# Patient Record
Sex: FEMALE | Race: WHITE | NOT HISPANIC OR LATINO | Employment: OTHER | URBAN - METROPOLITAN AREA
[De-identification: names, ages, dates, MRNs, and addresses within clinical notes are randomized per-mention and may not be internally consistent; named-entity substitution may affect disease eponyms.]

---

## 2022-01-25 ENCOUNTER — ESTABLISHED PATIENT (OUTPATIENT)
Dept: URBAN - METROPOLITAN AREA CLINIC 21 | Facility: CLINIC | Age: 82
End: 2022-01-25

## 2022-01-25 DIAGNOSIS — H02.886: ICD-10-CM

## 2022-01-25 DIAGNOSIS — H57.89: ICD-10-CM

## 2022-01-25 DIAGNOSIS — H01.021: ICD-10-CM

## 2022-01-25 DIAGNOSIS — H01.024: ICD-10-CM

## 2022-01-25 DIAGNOSIS — H02.883: ICD-10-CM

## 2022-01-25 DIAGNOSIS — H25.13: ICD-10-CM

## 2022-01-25 PROCEDURE — 99202 OFFICE O/P NEW SF 15 MIN: CPT

## 2022-01-25 ASSESSMENT — TONOMETRY
OD_IOP_MMHG: 17
OS_IOP_MMHG: 20

## 2022-01-25 ASSESSMENT — VISUAL ACUITY
OS_SC: 20/40
OD_SC: 20/80

## 2022-01-25 NOTE — PATIENT DISCUSSION
patient to continue to use Tobradex to the right eye if it continues to drain over the next few days. If that does not seem to help, will send in Sohail-poly-dex inplace three times a day for one week.

## 2022-02-18 ENCOUNTER — FOLLOW UP (OUTPATIENT)
Dept: URBAN - METROPOLITAN AREA CLINIC 20 | Facility: CLINIC | Age: 82
End: 2022-02-18

## 2022-02-18 DIAGNOSIS — H16.223: ICD-10-CM

## 2022-02-18 DIAGNOSIS — H21.562: ICD-10-CM

## 2022-02-18 DIAGNOSIS — H02.412: ICD-10-CM

## 2022-02-18 PROCEDURE — 68840 EXPLORE/IRRIGATE TEAR DUCTS: CPT

## 2022-02-18 PROCEDURE — 99213 OFFICE O/P EST LOW 20 MIN: CPT

## 2022-02-18 RX ORDER — LOTEPREDNOL ETABONATE 2 MG/ML
1 SUSPENSION/ DROPS OPHTHALMIC TWICE A DAY
End: 2022-03-11

## 2022-02-18 ASSESSMENT — TONOMETRY
OS_IOP_MMHG: 18
OD_IOP_MMHG: 17

## 2022-02-18 ASSESSMENT — VISUAL ACUITY
OS_SC: 20/40
OD_SC: 20/80

## 2022-02-18 NOTE — PROCEDURE NOTE: CLINICAL
PROCEDURE NOTE: Probing of Lacrimal Canaliculi, With or Without Irrigation Bilateral Lower Lids. Diagnosis: Mild Keratoconjunctivitis Sicca. Prior to treatment, the risks/benefits/alternatives were discussed. The patient wished to proceed with procedure. Patient tolerated procedure well. There were no complications. Post-op instructions given. Venita Horton

## 2022-02-18 NOTE — PATIENT DISCUSSION
Ptosis is when the upper eyelid droops over the eye. The eyelid may droop just a little, or so much that it covers the pupil (the black dot at the center of your eye that lets light in). Ptosis can limit or even completely block normal vision. Fortunately, this condition can be treated to improve vision as well as appearance. Ptosis occurs when the levator muscle stretches or separates away from their eyelid. This can be caused by aging or an eye injury. Rarely, diseases or tumors can affect the eyelid muscle, causing ptosis.

## 2022-02-18 NOTE — PATIENT DISCUSSION
PROBING AND IRRIGATION DONE TODAY with positive flow - PATIENT SAID SHE FEELS A LITTLE BETTER ALREADY. [FreeTextEntry1] : diarrhea

## 2022-02-18 NOTE — PATIENT DISCUSSION
OS SLIGHTLY SMALLER PUPIL -- WITH PTOSIS ALSO NOTED OS-- BROOKE'S SYNDROME? -- WILL HAVE DOCTORS AT HOME INVESTIGATE IF THEY HAVE CONCERN.

## 2022-11-07 ENCOUNTER — ESTABLISHED PATIENT (OUTPATIENT)
Dept: URBAN - METROPOLITAN AREA CLINIC 21 | Facility: CLINIC | Age: 82
End: 2022-11-07

## 2022-11-07 DIAGNOSIS — H02.412: ICD-10-CM

## 2022-11-07 DIAGNOSIS — H01.021: ICD-10-CM

## 2022-11-07 DIAGNOSIS — H01.024: ICD-10-CM

## 2022-11-07 DIAGNOSIS — H02.88A: ICD-10-CM

## 2022-11-07 DIAGNOSIS — H16.223: ICD-10-CM

## 2022-11-07 DIAGNOSIS — H25.813: ICD-10-CM

## 2022-11-07 DIAGNOSIS — H02.88B: ICD-10-CM

## 2022-11-07 PROCEDURE — 92136 OPHTHALMIC BIOMETRY: CPT

## 2022-11-07 PROCEDURE — 68801 DILATE TEAR DUCT OPENING: CPT

## 2022-11-07 PROCEDURE — 92014 COMPRE OPH EXAM EST PT 1/>: CPT

## 2022-11-07 PROCEDURE — 0330T TEAR FILM IMG UNI/BI W/I&R: CPT

## 2022-11-07 RX ORDER — CYCLOSPORINE 0 MG/ML
1 SOLUTION/ DROPS OPHTHALMIC; TOPICAL ONCE A DAY
Start: 2022-11-07

## 2022-11-07 RX ORDER — MINERAL OIL 2; 2 MG/.4ML; MG/.4ML
1 EMULSION OPHTHALMIC TWICE A DAY
Start: 2022-11-07

## 2022-11-07 ASSESSMENT — VISUAL ACUITY
OU_CC: J1-2
OU_SC: J2
OD_CC: 20/70-1
OU_CC: 20/25-1
OD_PH: 20/60
OS_CC: 20/30-2

## 2022-11-07 ASSESSMENT — KERATOMETRY
OD_K1POWER_DIOPTERS: 45.00
OD_AXISANGLE_DEGREES: 103
OD_K2POWER_DIOPTERS: 45.75
OS_AXISANGLE2_DEGREES: 90
OS_AXISANGLE_DEGREES: 0
OS_K1POWER_DIOPTERS: 45.50
OS_K2POWER_DIOPTERS: 45.50
OD_AXISANGLE2_DEGREES: 13

## 2022-11-07 ASSESSMENT — TONOMETRY
OD_IOP_MMHG: 18
OS_IOP_MMHG: 18

## 2022-11-07 NOTE — PATIENT DISCUSSION
"* Patient has some frustration in previous doctors NOT giving her a glasses Rx.  She would like to wait for getting cataract surgery due to some scary concerns.  She has friends that have not had a good outcome and it makes her very nervous.  We discussed those concerns in the office today.  -- THE PATIENT WILL NEED SURGERY SOON -- GLASSES ARE NOT GOING TO HELP HER MUCH AT THIS POINT.  HOWEVER, THE PATIENT IS STILL APPREHENSIVE AND WANTS TO ""THINK"" ABOUT IT. --  In the meantime. ..if the patient wishes to come in for an MRX to try to get her vision as good as possible w/o surgery. ...she can at any time. "

## 2022-11-07 NOTE — PATIENT DISCUSSION
** Pt saw doctor at home ([address]-- ruled out Carrol's syndrome 2022 -- .OS SLIGHTLY SMALLER PUPIL -- WITH PTOSIS ALSO NOTED OS--.

## 2022-11-07 NOTE — PATIENT DISCUSSION
* PROBING AND IRRIGATION DONE TODAY -- 2nd time. ...with positive flow - PATIENT SAID SHE FEELS A LITTLE BETTER ALREADY.

## 2022-11-07 NOTE — PROCEDURE NOTE: CLINICAL
PROCEDURE NOTE: Dilation of Lacrimal Punctum, With or Without Irrigation Prior to treatment, the risks/benefits/alternatives were discussed. The patient wished to proceed with procedure. Patient tolerated procedure well. There were no complications. Post op instructions given. Rupal Escoto

## 2022-11-07 NOTE — PATIENT DISCUSSION
* Sent message to Geoff Messina to get surgery dates scheduled for when patient comes back in town Jan 2023. -- Tentatively down for Jan. 25th and Feb 1, pt is aware.

## 2022-11-07 NOTE — PATIENT DISCUSSION
* Dr. Radha Staley Recommends:  - Toric (to get best vision clarity) // - Vivity lens (due to pt's concern over glare at night-- her  has had horrible trouble with that since he had his sx).

## 2022-11-09 ENCOUNTER — ESTABLISHED PATIENT (OUTPATIENT)
Dept: URBAN - METROPOLITAN AREA CLINIC 21 | Facility: CLINIC | Age: 82
End: 2022-11-09

## 2022-11-09 DIAGNOSIS — H02.88A: ICD-10-CM

## 2022-11-09 DIAGNOSIS — H02.412: ICD-10-CM

## 2022-11-09 DIAGNOSIS — H02.88B: ICD-10-CM

## 2022-11-09 DIAGNOSIS — H25.813: ICD-10-CM

## 2022-11-09 DIAGNOSIS — H21.562: ICD-10-CM

## 2022-11-09 DIAGNOSIS — H01.024: ICD-10-CM

## 2022-11-09 DIAGNOSIS — H01.021: ICD-10-CM

## 2022-11-09 DIAGNOSIS — H16.223: ICD-10-CM

## 2022-11-09 DIAGNOSIS — H52.4: ICD-10-CM

## 2022-11-09 PROCEDURE — 99212 OFFICE O/P EST SF 10 MIN: CPT

## 2022-11-09 ASSESSMENT — KERATOMETRY
OD_AXISANGLE2_DEGREES: 13
OD_K2POWER_DIOPTERS: 45.75
OS_AXISANGLE2_DEGREES: 90
OS_K1POWER_DIOPTERS: 45.50
OD_AXISANGLE_DEGREES: 103
OD_K1POWER_DIOPTERS: 45.00
OS_AXISANGLE_DEGREES: 0
OS_K2POWER_DIOPTERS: 45.50

## 2022-11-09 ASSESSMENT — TONOMETRY
OD_IOP_MMHG: 17
OS_IOP_MMHG: 13

## 2022-11-09 ASSESSMENT — VISUAL ACUITY
OU_CC: 20/30+2
OD_CC: 20/70
OS_CC: 20/40+1

## 2022-11-09 NOTE — PATIENT DISCUSSION
* Dr. Alana Christine Recommends:  - Toric (to get best vision clarity) // - Vivity lens (due to pt's concern over glare at night-- her  has had horrible trouble with that since he had his sx).

## 2022-11-09 NOTE — PATIENT DISCUSSION
PROBING AND IRRIGATION DONE TODAY with positive flow - PATIENT SAID SHE FEELS A LITTLE BETTER ALREADY.

## 2022-11-10 ENCOUNTER — ESTABLISHED PATIENT (OUTPATIENT)
Dept: URBAN - METROPOLITAN AREA CLINIC 21 | Facility: CLINIC | Age: 82
End: 2022-11-10

## 2022-11-10 DIAGNOSIS — H25.813: ICD-10-CM

## 2022-11-10 DIAGNOSIS — H16.223: ICD-10-CM

## 2022-11-10 DIAGNOSIS — H02.88B: ICD-10-CM

## 2022-11-10 DIAGNOSIS — H02.88A: ICD-10-CM

## 2022-11-10 PROCEDURE — 66999TH

## 2022-11-10 PROCEDURE — 66999PR

## 2022-11-10 PROCEDURE — 99211NC NO CHARGE VISIT

## 2022-11-10 RX ORDER — CYCLOSPORINE 0.5 MG/ML: 1 EMULSION OPHTHALMIC ONCE A DAY

## 2022-11-10 ASSESSMENT — VISUAL ACUITY
OU_CC: 20/40+2
OD_CC: 20/70-2
OS_CC: 20/40+1

## 2022-11-10 ASSESSMENT — TONOMETRY
OS_IOP_MMHG: 17
OD_IOP_MMHG: 18

## 2022-11-10 NOTE — PROCEDURE NOTE: CLINICAL
PROCEDURE NOTE: Thermi Lid and Probing All 4 Lids. Diagnosis: Meibomian Gland Dysfunction. The risks, benefits and alternatives of the procedure were discussed with the patient. The patient read and signed the consent form, was identified, and seated in the exam chair. After anesthesia prep was performed as above. There was a surgical time out to verify the patient, eye and medication. Slit lamp exam was preformed to view eyelid margins and Meibomian glands. Mar Ramal images were reviewed to establish location of specific target areas. Meibomian gland probes were used to unblock the obstructed Meibomian orifices. The eyelid tissue was warmed within a therapeutic target range of 36-38 degrees Celsius to melt the meibum blocking the orifices. Compression of the eyelid was used to express the melted meibum through the orifices. The eye was irrigated with sterile eye rinse solution. Patient tolerated procedure well. There were no complications. Post procedure instructions given. Itzel Rollins

## 2022-11-10 NOTE — PATIENT DISCUSSION
- Dr. Dyllan Cruz Recommends:  - Toric (to get best vision clarity) // - Vivity lens (due to pt's concern over glare at night-- her  has had horrible trouble with that since he had his sx).

## 2022-11-10 NOTE — PATIENT DISCUSSION
"- Patient has some frustration in previous doctors NOT giving her a glasses Rx.  She would like to wait for getting cataract surgery due to some scary concerns.  She has friends that have not had a good outcome and it makes her very nervous.  We discussed those concerns in the office today.  -- THE PATIENT WILL NEED SURGERY SOON -- GLASSES ARE NOT GOING TO HELP HER MUCH AT THIS POINT.  HOWEVER, THE PATIENT IS STILL APPREHENSIVE AND WANTS TO ""THINK"" ABOUT IT. --  In the meantime. ..if the patient wishes to come in for an MRX to try to get her vision as good as possible w/o surgery. ...she can at any time. "

## 2023-01-10 ENCOUNTER — ESTABLISHED PATIENT (OUTPATIENT)
Dept: URBAN - METROPOLITAN AREA CLINIC 20 | Facility: CLINIC | Age: 83
End: 2023-01-10

## 2023-01-10 VITALS
SYSTOLIC BLOOD PRESSURE: 158 MMHG | HEIGHT: 65 IN | DIASTOLIC BLOOD PRESSURE: 89 MMHG | HEART RATE: 67 BPM | WEIGHT: 170 LBS | BODY MASS INDEX: 28.32 KG/M2

## 2023-01-10 DIAGNOSIS — H02.88A: ICD-10-CM

## 2023-01-10 DIAGNOSIS — H16.223: ICD-10-CM

## 2023-01-10 DIAGNOSIS — H18.513: ICD-10-CM

## 2023-01-10 DIAGNOSIS — H25.813: ICD-10-CM

## 2023-01-10 DIAGNOSIS — H02.88B: ICD-10-CM

## 2023-01-10 PROCEDURE — 92136 OPHTHALMIC BIOMETRY: CPT

## 2023-01-10 PROCEDURE — 92014 COMPRE OPH EXAM EST PT 1/>: CPT

## 2023-01-10 ASSESSMENT — KERATOMETRY
OD_K1POWER_DIOPTERS: 44.25
OD_K2POWER_DIOPTERS: 45.25
OS_AXISANGLE2_DEGREES: 167
OD_K1POWER_DIOPTERS: 44.50
OD_AXISANGLE2_DEGREES: 13
OD_AXISANGLE_DEGREES: 105
OD_AXISANGLE_DEGREES: 103
OS_AXISANGLE_DEGREES: 78
OS_K2POWER_DIOPTERS: 45.75
OS_K1POWER_DIOPTERS: 44.00
OD_AXISANGLE_DEGREES: 108
OS_AXISANGLE2_DEGREES: 168
OS_AXISANGLE_DEGREES: 77
OS_AXISANGLE_DEGREES: 79
OD_AXISANGLE2_DEGREES: 18
OD_AXISANGLE2_DEGREES: 15
OS_AXISANGLE2_DEGREES: 169
OD_K2POWER_DIOPTERS: 45.50
OS_K1POWER_DIOPTERS: 43.75

## 2023-01-10 ASSESSMENT — VISUAL ACUITY
OU_SC: 20/70
OD_SC: 20/200+1
OU_SC: 20/80
OU_CC: 20/30-3
OS_SC: 20/100
OS_CC: 20/40-2
OU_CC: 20/30
OD_CC: 20/100-2

## 2023-01-10 ASSESSMENT — TONOMETRY
OD_IOP_MMHG: 17
OS_IOP_MMHG: 17

## 2023-01-10 NOTE — PATIENT DISCUSSION
- Dr. Yue Cavanaugh Recommends:  - Toric (to get best vision clarity) // - Vivity lens (due to pt's concern over glare at night-- her  has had horrible trouble with that since he had his sx).

## 2023-01-26 ENCOUNTER — POST-OP (OUTPATIENT)
Dept: URBAN - METROPOLITAN AREA CLINIC 21 | Facility: CLINIC | Age: 83
End: 2023-01-26

## 2023-01-26 DIAGNOSIS — Z96.1: ICD-10-CM

## 2023-01-26 PROCEDURE — 99024 POSTOP FOLLOW-UP VISIT: CPT

## 2023-01-26 ASSESSMENT — VISUAL ACUITY
OD_SC: 20/200
OU_SC: 20/40
OS_SC: J7-1
OS_SC: 20/40

## 2023-01-26 ASSESSMENT — TONOMETRY
OD_IOP_MMHG: 14
OS_IOP_MMHG: 15

## 2023-01-26 NOTE — PATIENT DISCUSSION
** Pt saw doctor at home ([address]-- ruled out Carrol's syndrome 2022 -- .OS SLIGHTLY SMALLER PUPIL -- WITH PTOSIS ALSO NOTED OS--.

## 2023-01-26 NOTE — PATIENT DISCUSSION
- Dr. Lakisha Walton Recommends:  - Toric (to get best vision clarity) // - Vivity lens (due to pt's concern over glare at night-- her  has had horrible trouble with that since he had his sx).

## 2023-01-30 ENCOUNTER — POST-OP (OUTPATIENT)
Dept: URBAN - METROPOLITAN AREA CLINIC 21 | Facility: CLINIC | Age: 83
End: 2023-01-30

## 2023-01-30 DIAGNOSIS — H25.811: ICD-10-CM

## 2023-01-30 DIAGNOSIS — Z96.1: ICD-10-CM

## 2023-01-30 PROCEDURE — 92136 OPHTHALMIC BIOMETRY: CPT

## 2023-01-30 PROCEDURE — 99024 POSTOP FOLLOW-UP VISIT: CPT

## 2023-01-30 ASSESSMENT — KERATOMETRY
OS_K2POWER_DIOPTERS: 46.00
OD_AXISANGLE2_DEGREES: 13
OS_AXISANGLE2_DEGREES: 153
OD_AXISANGLE_DEGREES: 103
OD_K1POWER_DIOPTERS: 44.50
OS_K1POWER_DIOPTERS: 44.25
OS_AXISANGLE_DEGREES: 63
OD_K2POWER_DIOPTERS: 45.75

## 2023-01-30 ASSESSMENT — VISUAL ACUITY
OS_SC: 20/50+1
OD_SC: 20/200
OS_SC: J5
OS_PH: 20/40
OD_SC: J10

## 2023-01-30 ASSESSMENT — TONOMETRY
OS_IOP_MMHG: 19
OD_IOP_MMHG: 17